# Patient Record
Sex: FEMALE | Race: WHITE | Employment: UNEMPLOYED | URBAN - METROPOLITAN AREA
[De-identification: names, ages, dates, MRNs, and addresses within clinical notes are randomized per-mention and may not be internally consistent; named-entity substitution may affect disease eponyms.]

---

## 2017-01-17 ENCOUNTER — APPOINTMENT (OUTPATIENT)
Dept: HEMATOLOGY/ONCOLOGY | Facility: HOSPITAL | Age: 47
End: 2017-01-17
Attending: INTERNAL MEDICINE
Payer: COMMERCIAL

## 2017-01-17 ENCOUNTER — NURSE ONLY (OUTPATIENT)
Dept: HEMATOLOGY/ONCOLOGY | Facility: HOSPITAL | Age: 47
End: 2017-01-17

## 2017-01-17 NOTE — PROGRESS NOTES
Received Remicade order from Dr. Erendira Van office. Updated with new dose 10mg/kg. Order given to Hay Carmona for [address]. Pt will then need to be scheduled for PL (quant TB) and Remicade infusion.

## 2017-01-31 ENCOUNTER — TELEPHONE (OUTPATIENT)
Dept: HEMATOLOGY/ONCOLOGY | Facility: HOSPITAL | Age: 47
End: 2017-01-31

## 2017-02-02 ENCOUNTER — APPOINTMENT (OUTPATIENT)
Dept: HEMATOLOGY/ONCOLOGY | Facility: HOSPITAL | Age: 47
End: 2017-02-02
Payer: COMMERCIAL

## 2017-02-06 ENCOUNTER — OFFICE VISIT (OUTPATIENT)
Dept: HEMATOLOGY/ONCOLOGY | Facility: HOSPITAL | Age: 47
End: 2017-02-06
Attending: INTERNAL MEDICINE
Payer: COMMERCIAL

## 2017-02-06 VITALS — HEART RATE: 83 BPM | SYSTOLIC BLOOD PRESSURE: 106 MMHG | DIASTOLIC BLOOD PRESSURE: 64 MMHG | TEMPERATURE: 98 F

## 2017-02-06 DIAGNOSIS — K51.00 CHRONIC UNIVERSAL ULCERATIVE COLITIS (HCC): Primary | ICD-10-CM

## 2017-02-06 PROCEDURE — 96415 CHEMO IV INFUSION ADDL HR: CPT

## 2017-02-06 PROCEDURE — 96413 CHEMO IV INFUSION 1 HR: CPT

## 2017-02-06 PROCEDURE — 96375 TX/PRO/DX INJ NEW DRUG ADDON: CPT

## 2017-02-06 RX ORDER — ACETAMINOPHEN 325 MG/1
650 TABLET ORAL ONCE
Status: COMPLETED | OUTPATIENT
Start: 2017-02-06 | End: 2017-02-06

## 2017-02-06 RX ADMIN — ACETAMINOPHEN 650 MG: 325 TABLET ORAL at 14:00:00

## 2017-02-06 NOTE — PROGRESS NOTES
Education Record    Learner:  Patient    Disease / Diagnosis:UC    Barriers / Limitations:  None   Comments:    Method:  Reinforcement   Comments:    General Topics:  Medication, Procedure, Side effects and symptom management and Plan of care reviewed   Co

## 2017-03-30 ENCOUNTER — OFFICE VISIT (OUTPATIENT)
Dept: HEMATOLOGY/ONCOLOGY | Facility: HOSPITAL | Age: 47
End: 2017-03-30
Attending: INTERNAL MEDICINE
Payer: COMMERCIAL

## 2017-03-30 VITALS
HEIGHT: 64.02 IN | BODY MASS INDEX: 21.25 KG/M2 | SYSTOLIC BLOOD PRESSURE: 134 MMHG | WEIGHT: 124.5 LBS | HEART RATE: 79 BPM | DIASTOLIC BLOOD PRESSURE: 86 MMHG | OXYGEN SATURATION: 99 % | RESPIRATION RATE: 18 BRPM | TEMPERATURE: 98 F

## 2017-03-30 DIAGNOSIS — K51.00 CHRONIC UNIVERSAL ULCERATIVE COLITIS (HCC): Primary | ICD-10-CM

## 2017-03-30 PROCEDURE — 96415 CHEMO IV INFUSION ADDL HR: CPT

## 2017-03-30 PROCEDURE — 96413 CHEMO IV INFUSION 1 HR: CPT

## 2017-03-30 RX ORDER — ACETAMINOPHEN 325 MG/1
650 TABLET ORAL ONCE
Status: COMPLETED | OUTPATIENT
Start: 2017-03-30 | End: 2017-03-30

## 2017-03-30 RX ADMIN — ACETAMINOPHEN 650 MG: 325 TABLET ORAL at 13:43:00

## 2017-05-02 PROCEDURE — 88305 TISSUE EXAM BY PATHOLOGIST: CPT | Performed by: INTERNAL MEDICINE

## 2018-03-12 PROCEDURE — 88175 CYTOPATH C/V AUTO FLUID REDO: CPT | Performed by: OBSTETRICS & GYNECOLOGY

## 2018-03-12 PROCEDURE — 87624 HPV HI-RISK TYP POOLED RSLT: CPT | Performed by: OBSTETRICS & GYNECOLOGY

## 2018-07-30 PROCEDURE — 86480 TB TEST CELL IMMUN MEASURE: CPT | Performed by: INTERNAL MEDICINE

## 2019-07-11 PROCEDURE — 80299 QUANTITATIVE ASSAY DRUG: CPT | Performed by: INTERNAL MEDICINE

## 2019-08-09 PROCEDURE — 80299 QUANTITATIVE ASSAY DRUG: CPT | Performed by: INTERNAL MEDICINE

## 2019-08-13 PROCEDURE — 88305 TISSUE EXAM BY PATHOLOGIST: CPT | Performed by: INTERNAL MEDICINE

## 2019-10-02 ENCOUNTER — OFFICE VISIT (OUTPATIENT)
Dept: INTERNAL MEDICINE CLINIC | Facility: CLINIC | Age: 49
End: 2019-10-02
Payer: COMMERCIAL

## 2019-10-02 VITALS
BODY MASS INDEX: 20.83 KG/M2 | HEART RATE: 76 BPM | OXYGEN SATURATION: 96 % | RESPIRATION RATE: 16 BRPM | SYSTOLIC BLOOD PRESSURE: 108 MMHG | WEIGHT: 125 LBS | HEIGHT: 65 IN | TEMPERATURE: 99 F | DIASTOLIC BLOOD PRESSURE: 62 MMHG

## 2019-10-02 DIAGNOSIS — M85.80 OSTEOPENIA, UNSPECIFIED LOCATION: ICD-10-CM

## 2019-10-02 DIAGNOSIS — Z13.89 SCREENING FOR GENITOURINARY CONDITION: ICD-10-CM

## 2019-10-02 DIAGNOSIS — Z11.1 PPD SCREENING TEST: ICD-10-CM

## 2019-10-02 DIAGNOSIS — K51.00 CHRONIC UNIVERSAL ULCERATIVE COLITIS (HCC): ICD-10-CM

## 2019-10-02 DIAGNOSIS — Z00.00 ANNUAL PHYSICAL EXAM: Primary | ICD-10-CM

## 2019-10-02 PROCEDURE — 86580 TB INTRADERMAL TEST: CPT | Performed by: NURSE PRACTITIONER

## 2019-10-02 PROCEDURE — 99386 PREV VISIT NEW AGE 40-64: CPT | Performed by: NURSE PRACTITIONER

## 2019-10-02 NOTE — PATIENT INSTRUCTIONS
Prevention Guidelines, Women Ages 36 to 52  Screening tests and vaccines are an important part of managing your health. A screening test is done to find diseases in people who don't have any symptoms.  The goal is to find a disease early so lifestyle gongora · Flexible sigmoidoscopy every 5 years, or  · Colonoscopy every 10 years, or  · CT colonography (virtual colonoscopy) every 5 years, or  · Yearly fecal occult blood test, or  · Yearly fecal immunochemical test every year, or  · Stool DNA test, every 3 year Chickenpox (varicella) All women in this age group who have no record of this infection or vaccine 2 doses; the second dose should be given at least 4 weeks after the first dose   Hepatitis A Women at increased risk for infection–talk with your healthcare Use of tobacco and the health effects it can cause All women in this age group Every exam   1 American Diabetes Association  2 [employer]ians [employer] Gynecologists   [address]  [address] [facility]  Date Last R

## 2019-10-02 NOTE — PROGRESS NOTES
Wellness Exam    CC: Patient is presenting for a wellness exam    HPI:   Concerns: new to our office. Follows with Dr. Ana Velasco for UC, diagnosed about 20 years ago, was on prednisone for first 8 years, now on merecaptopurine and remicade injection.  Doing w 8/13/2019    Performed by Flora Barger MD at [address] [place] [address], POSSIBLE BIOPSY, POSSIBLE POLYPECTOMY [id number] N/A 5/2/2017    Performed by Flora Barger MD at [facility] History    Tobacco Use      Smoki Constitutional: She is oriented to person, place, and time. She appears well-developed. No distress. Head: Normocephalic and atraumatic. Eyes: EOM are normal. Pupils are equal, round, and reactive to light. No scleral icterus.  Fundoscopic exam: No he Medical education done. Outcome: Patient verbalizes understanding.        Educated by: LUCIO Saez

## 2019-10-04 ENCOUNTER — NURSE ONLY (OUTPATIENT)
Dept: INTERNAL MEDICINE CLINIC | Facility: CLINIC | Age: 49
End: 2019-10-04
Payer: COMMERCIAL

## 2020-09-14 ENCOUNTER — APPOINTMENT (OUTPATIENT)
Dept: GENERAL RADIOLOGY | Age: 50
End: 2020-09-14
Attending: NURSE PRACTITIONER
Payer: COMMERCIAL

## 2020-09-14 ENCOUNTER — HOSPITAL ENCOUNTER (OUTPATIENT)
Age: 50
Discharge: HOME | End: 2020-09-14
Payer: COMMERCIAL

## 2020-09-14 VITALS
SYSTOLIC BLOOD PRESSURE: 139 MMHG | RESPIRATION RATE: 16 BRPM | OXYGEN SATURATION: 99 % | HEART RATE: 68 BPM | TEMPERATURE: 99 F | DIASTOLIC BLOOD PRESSURE: 90 MMHG | WEIGHT: 134.06 LBS | BODY MASS INDEX: 23 KG/M2

## 2020-09-14 DIAGNOSIS — S60.222A CONTUSION OF LEFT HAND, INITIAL ENCOUNTER: ICD-10-CM
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
## 2020-09-14 PROCEDURE — A9150 MISC/EXPER NON-PRESCRIPT DRU: HCPCS | Performed by: NURSE PRACTITIONER

## 2020-09-14 PROCEDURE — 99213 OFFICE O/P EST LOW 20 MIN: CPT | Performed by: NURSE PRACTITIONER

## 2020-09-14 PROCEDURE — A6449 LT COMPRES BAND >=3" <5"/YD: HCPCS | Performed by: NURSE PRACTITIONER

## 2020-09-14 PROCEDURE — 73130 X-RAY EXAM OF HAND: CPT | Performed by: NURSE PRACTITIONER

## 2020-09-14 RX ORDER — IBUPROFEN 200 MG
400 TABLET ORAL ONCE
Status: COMPLETED | OUTPATIENT
Start: 2020-09-14 | End: 2020-09-14

## 2020-09-15 NOTE — ED PROVIDER NOTES
Patient Seen in: [address] [facility]      History   Patient presents with:  Upper Extremity Injury: Left hand    Stated Complaint: left hand injury    43-year-old female with a history of Hashimoto's, Remicade use presents to the im Constitutional: Negative. Respiratory: Negative. Cardiovascular: Negative. Gastrointestinal: Negative. Musculoskeletal: Positive for arthralgias. Skin: Negative. Neurological: Negative.         Positive for stated complaint: left hand inj present. Mental Status: She is alert and oriented to person, place, and time.            ED Course   Labs Reviewed - No data to display       Xr Hand (min 3 Views), Left (cpt=73130)   Xr Hand (min 3 Views), Left (cpt=73130)    Result Date: 9/14/2020  P instructions regarding her diagnosis, expectations, follow up, and return to the ER precautions. I explained to the patient that emergent conditions may arise to return to the immediate care or ER for new, worsening or any persistent conditions.   I've exp

## (undated) NOTE — MR AVS SNAPSHOT
After Visit Summary   2/6/2017    Sheila Comes    MRN: [id number]           Diagnoses this Visit     Chronic universal ulcerative colitis ([facility] [address]    -  Primary       Allergies     Coumadin Hives    Amoxicillin Hives      Your Vital Signs Were

## (undated) NOTE — MR AVS SNAPSHOT
After Visit Summary   1/17/2017    Oralia Araujo    MRN: [id number]           Allergies     Coumadin Hives    Amoxicillin Hives      Your Vital Signs Were     Smoking Status                   Never Smoker                      Patient Instructions

## (undated) NOTE — MR AVS SNAPSHOT
After Visit Summary   3/30/2017    Herchel Seip    MRN: [id number]           Diagnoses this Visit     Chronic universal ulcerative colitis ([facility] [address]    -  Primary       Allergies     Coumadin Hives    Amoxicillin Hives      Your Vital Signs Were